# Patient Record
Sex: FEMALE | Race: WHITE | NOT HISPANIC OR LATINO | URBAN - METROPOLITAN AREA
[De-identification: names, ages, dates, MRNs, and addresses within clinical notes are randomized per-mention and may not be internally consistent; named-entity substitution may affect disease eponyms.]

---

## 2023-07-05 PROBLEM — E78.5 HYPERLIPIDEMIA: Status: ACTIVE | Noted: 2023-07-05

## 2023-08-10 DIAGNOSIS — I10 BENIGN ESSENTIAL HYPERTENSION: ICD-10-CM

## 2023-08-22 PROBLEM — I10 BENIGN ESSENTIAL HYPERTENSION: Status: ACTIVE | Noted: 2023-08-22

## 2023-08-22 RX ORDER — BISOPROLOL FUMARATE AND HYDROCHLOROTHIAZIDE 5; 6.25 MG/1; MG/1
1 TABLET ORAL DAILY
Qty: 90 TABLET | Refills: 3 | Status: SHIPPED | OUTPATIENT
Start: 2023-08-22

## 2023-08-22 RX ORDER — BISOPROLOL FUMARATE AND HYDROCHLOROTHIAZIDE 5; 6.25 MG/1; MG/1
1 TABLET ORAL DAILY
COMMUNITY
Start: 2013-08-23 | End: 2023-08-22 | Stop reason: SDUPTHER

## 2023-08-22 RX ORDER — LOSARTAN POTASSIUM 50 MG/1
50 TABLET ORAL DAILY
Qty: 100 TABLET | Refills: 2 | Status: SHIPPED | OUTPATIENT
Start: 2023-08-22

## 2025-01-28 ENCOUNTER — HOME VISIT (OUTPATIENT)
Dept: POST ACUTE CARE | Facility: EXTERNAL LOCATION | Age: 80
End: 2025-01-28

## 2025-01-28 DIAGNOSIS — F51.3 SLEEP WALKING DISORDER: ICD-10-CM

## 2025-01-28 DIAGNOSIS — F41.9 ANXIETY AND DEPRESSION: ICD-10-CM

## 2025-01-28 DIAGNOSIS — E78.5 HYPERLIPIDEMIA, UNSPECIFIED HYPERLIPIDEMIA TYPE: ICD-10-CM

## 2025-01-28 DIAGNOSIS — F03.90 ADVANCING DEMENTIA (MULTI): Primary | ICD-10-CM

## 2025-01-28 DIAGNOSIS — F32.A ANXIETY AND DEPRESSION: ICD-10-CM

## 2025-01-28 DIAGNOSIS — M81.0 OSTEOPOROSIS, UNSPECIFIED OSTEOPOROSIS TYPE, UNSPECIFIED PATHOLOGICAL FRACTURE PRESENCE: ICD-10-CM

## 2025-01-28 DIAGNOSIS — I10 BENIGN ESSENTIAL HYPERTENSION: ICD-10-CM

## 2025-02-01 PROBLEM — F32.A ANXIETY AND DEPRESSION: Status: ACTIVE | Noted: 2025-02-01

## 2025-02-01 PROBLEM — F51.3 SLEEP WALKING DISORDER: Status: ACTIVE | Noted: 2025-02-01

## 2025-02-01 PROBLEM — M81.0 OSTEOPOROSIS: Status: ACTIVE | Noted: 2025-02-01

## 2025-02-01 PROBLEM — F41.9 ANXIETY AND DEPRESSION: Status: ACTIVE | Noted: 2025-02-01

## 2025-02-01 PROBLEM — F03.90 ADVANCING DEMENTIA (MULTI): Status: ACTIVE | Noted: 2025-02-01

## 2025-02-01 NOTE — PROGRESS NOTES
Kiley Coleman   79 y.o.  female  @location@            Assessment and Plan:  History and physical    1. Hypercholesteremia E78.00 Controlled very well.  Continue simvastatin.  Liberalize diet and allow full fat products to prevent weight loss.  2. Cognitive impairment R41.89 Symptoms persist. She is tolerating donepezil 5 mg well. Upon: Discussion we decided today to upped her donepezil dose to 10 mg daily. I also recommend to schedule appointment with Dr. Nix. I recommend careful driving and her daughter Ania supervise patient with driving skills.  Ordered:   BRENDAN [facility] [place] Referral, CARMEN PhD, AMIR, 07/05/2023, Order for future visit   3. Diverticulosis K57.90 Controlled well. Continue probiotic. Increase eating yogurt.    4. Right knee DJD M17.11 Not controlled. Symptoms worsened in the last 6 to 8 months. Recommend appointment with orthopedics.  Ordered:   AMB [facility] [place] Referral, MADHURI PAIGE DO, 07/05/2023, Order for future visit   5. Breast cancer screening by mammogram Z12.31 Due in December 2023.  Ordered:   [facility]AL, 07/05/2023, Routine, SCREENING, Ambulatory, 3D;, Breast cancer screening by mammogram   6. 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 She did not have any injury related to nighttime walking. She shows more of the signs of confusion and no recollection of the event.  7. Weight loss R63.4 Progressed. Lost another 5 pounds since last office visit about 4 months ago. Recommend to keep scheduled meals 4 times a day; patient may substitute supper with 1 serving of Ensure.  8. Vitamin D deficiency E55.9 Recommend to optimize intake of calcium up to 1200 Mg a day and vitamin D up to 2000 Mg a day.  9. Sebaceous cyst of right axilla L72.3 Examined today. No signs of infection. No lymphadenopathy in bilateral axilla. Monitor.    10. Hypertension.  Continue losartan 50 mg 2 times a day.    I discussed advanced care planning for more than 16 minutes including the explanation and  discussion of advanced directives. Information and advise was also provided on DO NOT RESUSCITATE and patient encouraged to consider this  Patient is not sure about DNR at this time.      -Fall prevention    -Cognitive engagement     -Monitor and treat blood pressure     -Aggressive decubitus ulcer prevention.     -Bowel and bladder care     -Optimal nutrition and supplementation as needed     -GI  and DVT prophylaxis     -Symptom control     -Ambulation as tolerated     -Will follow    Charting is done using voice recognition software and may contain errors which have not been completely corrected    Source of history: Nurse, Medical personnel, Medical record, Patient.  History limitation: None.    HPI:  History and physical    Patient is unable to give any detailed history and therefore history is obtained from the chart  No acute complaints voiced by the patient or acute concerns raised by nursing    History of hospitalization-    Problem List/Past Medical History Ongoing Alzheimer's dementia Anxiety Arthritis Breast cancer screening by mammogram Chronic GERD Cognitive impairment Diverticulosis Generalized anxiety disorder Hypercholesteremia Hyperlipidemia Hypertension Intolerance of oral bisphosphonate therapy Macular degeneration Memory changes Moderate dementia with anxiety Osteoporosis Right knee DJD Sebaceous cyst of right axilla Sleep walking disorder Vitamin D deficiency Weight loss Procedure/Surgical History Colonoscopy.: 04/15/19  right breast lumpectomy ( benign): 1992  hysterectomy: 1986  Excision of midfoot exostosis, Left foot  Ankle surgery    Allergies codeine unknown penicillins Social History   Alcohol - Denies Alcohol Use Use:Current Type:Liquor Frequency:3-5 times per week   Substance Abuse - Denies Substance Abuse   Tobacco - Low Risk Use:Former smoker, quit more than 30 days ago Family History Hypertension: Mother and Father.      Current Outpatient Medications   Medication Sig Dispense  Refill    bisoproloL-hydrochlorothiazide (Ziac) 5-6.25 mg tablet TAKE 1 TABLET BY MOUTH  DAILY 90 tablet 3    bisoproloL-hydrochlorothiazide (Ziac) 5-6.25 mg tablet Take 1 tablet by mouth once daily. 90 tablet 3    losartan (Cozaar) 50 mg tablet TAKE 1 TABLET BY MOUTH DAILY 100 tablet 2    simvastatin (Zocor) 20 mg tablet TAKE 1 TABLET BY MOUTH  DAILY 100 tablet 2     No current facility-administered medications for this visit.       Physical Exam:  Vital signs as per nursing/MA documentation were reviewed  General appearance: Alert and in no acute distress  HEENT: Normal Inspection  Neck - Normal Inspection  Respiratory : No respiratory distress. Lungs are clear   Cardiovascular: heart rate normal. No gallop  Back - normal inspection  Skin inspection:Warm  Musculoskeletal : No deformities  Neuro : Limited exam. Baseline    ROS: Review of symptoms is negative except for what is mentioned in HPI    Results/Data  Records including but not limited to electronic medical records, relevant lab work and imaging from patient's health care facility encounter were reviewed and independently verified      Charting was completed using voice recognition technology and may include unintended errors.    Discussed with patient/family, RN, and NP.

## 2025-02-27 ENCOUNTER — HOME VISIT (OUTPATIENT)
Dept: POST ACUTE CARE | Facility: EXTERNAL LOCATION | Age: 80
End: 2025-02-27
Payer: MEDICARE

## 2025-02-27 DIAGNOSIS — F03.90 ADVANCING DEMENTIA (MULTI): ICD-10-CM

## 2025-02-27 DIAGNOSIS — F41.9 ANXIETY AND DEPRESSION: ICD-10-CM

## 2025-02-27 DIAGNOSIS — F32.A ANXIETY AND DEPRESSION: ICD-10-CM

## 2025-02-27 DIAGNOSIS — F51.3 SLEEP WALKING DISORDER: ICD-10-CM

## 2025-02-27 DIAGNOSIS — M81.0 OSTEOPOROSIS, UNSPECIFIED OSTEOPOROSIS TYPE, UNSPECIFIED PATHOLOGICAL FRACTURE PRESENCE: ICD-10-CM

## 2025-02-27 DIAGNOSIS — I10 BENIGN ESSENTIAL HYPERTENSION: Primary | ICD-10-CM

## 2025-02-27 PROCEDURE — 99348 HOME/RES VST EST LOW MDM 30: CPT | Performed by: EMERGENCY MEDICINE

## 2025-03-01 NOTE — PROGRESS NOTES
Kiley Coleman   79 y.o.  female  @location@            Assessment and Plan:      1. Hypercholesteremia E78.00 Controlled very well.  Continue simvastatin.  Liberalize diet and allow full fat products to prevent weight loss.  2. Cognitive impairment R41.89 Symptoms persist. She is tolerating donepezil 5 mg well. Upon: Discussion we decided today to upped her donepezil dose to 10 mg daily. I also recommend to schedule appointment with Dr. iNx. I recommend careful driving and her daughter Ania supervise patient with driving skills.  Ordered:   [facility] [place], CARMEN PhD, AMIR, 07/05/2023, Order for future visit   3. Diverticulosis K57.90 Controlled well. Continue probiotic. Increase eating yogurt.    4. Right knee DJD M17.11 Not controlled. Symptoms worsened in the last 6 to 8 months. Recommend appointment with orthopedics.  Ordered:   [facility] [place], MADHURI PAIGE DO, 07/05/2023, Order for future visit   5. Breast cancer screening by mammogram Z12.31 Due in December 2023.  Ordered:   [facility]M DIGITAL SCRN BILATERAL, 07/05/2023, Routine, SCREENING, Ambulatory, 3D;, Breast cancer screening by mammogram   6. 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 She did not have any injury related to nighttime walking. She shows more of the signs of confusion and no recollection of the event.  7. Weight loss R63.4 Progressed. Lost another 5 pounds since last office visit about 4 months ago. Recommend to keep scheduled meals 4 times a day; patient may substitute supper with 1 serving of Ensure.  8. Vitamin D deficiency E55.9 Recommend to optimize intake of calcium up to 1200 Mg a day and vitamin D up to 2000 Mg a day.  9. Sebaceous cyst of right axilla L72.3 Examined today. No signs of infection. No lymphadenopathy in bilateral axilla. Monitor.    10. Hypertension.  Continue losartan 50 mg 2 times a day.    Rx list reviewed.   PT and OT evaluation is in the process.   Routine safety measures, fall precautions, risk  modification and alarm placement if needed for prevention of falls.   Skin care precautions, prevention of pressures sores at pressure points assessed.   Pt needs to be monitored frequently by nursing staff particularly at night time.   Any confusion, agitation or behavioural disturbance needs to be attended, as per home policy   rapid covid Ag assay need to be done, notify if positive.   If needed appropriate measures to be taken for alarm placements and assisted devices, pt was told not to get up and ambulate at night unless help and assist available at bedside,   labs will be done as per our routine protocol.   PO intake need to be monitored if consuming po.       Charting is done using voice recognition software and may contain errors which have not been completely corrected      Source of history: Nurse, Medical personnel, Medical record, Patient.  History limitation: None.    HPI:      Patient is unable to give any detailed history and therefore history is obtained from the chart  No acute complaints voiced by the patient or acute concerns raised by nursing    History of hospitalization-    Problem List/Past Medical History Ongoing Alzheimer's dementia Anxiety Arthritis Breast cancer screening by mammogram Chronic GERD Cognitive impairment Diverticulosis Generalized anxiety disorder Hypercholesteremia Hyperlipidemia Hypertension Intolerance of oral bisphosphonate therapy Macular degeneration Memory changes Moderate dementia with anxiety Osteoporosis Right knee DJD Sebaceous cyst of right axilla Sleep walking disorder Vitamin D deficiency Weight loss Procedure/Surgical History Colonoscopy.: 04/15/19  right breast lumpectomy ( benign): 1992  hysterectomy: 1986  Excision of midfoot exostosis, Left foot  Ankle surgery    Allergies codeine unknown penicillins Social History   Alcohol - Denies Alcohol Use Use:Current Type:Liquor Frequency:3-5 times per week   Substance Abuse - Denies Substance Abuse   Tobacco - Low  Risk Use:Former smoker, quit more than 30 days ago Family History Hypertension: Mother and Father.      Current Outpatient Medications   Medication Sig Dispense Refill    bisoproloL-hydrochlorothiazide (Ziac) 5-6.25 mg tablet TAKE 1 TABLET BY MOUTH  DAILY 90 tablet 3    bisoproloL-hydrochlorothiazide (Ziac) 5-6.25 mg tablet Take 1 tablet by mouth once daily. 90 tablet 3    losartan (Cozaar) 50 mg tablet TAKE 1 TABLET BY MOUTH DAILY 100 tablet 2    simvastatin (Zocor) 20 mg tablet TAKE 1 TABLET BY MOUTH  DAILY 100 tablet 2     No current facility-administered medications for this visit.       Physical Exam:  Vital signs as per nursing/MA documentation were reviewed  General appearance: Alert and in no acute distress  HEENT: Normal Inspection  Neck - Normal Inspection  Respiratory : No respiratory distress. Lungs are clear   Cardiovascular: heart rate normal. No gallop  Back - normal inspection  Skin inspection:Warm  Musculoskeletal : No deformities  Neuro : Limited exam. Baseline    ROS: Review of symptoms is negative except for what is mentioned in HPI    Results/Data  Records including but not limited to electronic medical records, relevant lab work and imaging from patient's health care facility encounter were reviewed and independently verified      Charting was completed using voice recognition technology and may include unintended errors.    Discussed with patient/family, RN, and NP.

## 2025-03-20 ENCOUNTER — HOME VISIT (OUTPATIENT)
Dept: POST ACUTE CARE | Facility: EXTERNAL LOCATION | Age: 80
End: 2025-03-20
Payer: MEDICARE

## 2025-03-20 DIAGNOSIS — M81.0 OSTEOPOROSIS, UNSPECIFIED OSTEOPOROSIS TYPE, UNSPECIFIED PATHOLOGICAL FRACTURE PRESENCE: ICD-10-CM

## 2025-03-20 DIAGNOSIS — F32.A ANXIETY AND DEPRESSION: Primary | ICD-10-CM

## 2025-03-20 DIAGNOSIS — E78.5 HYPERLIPIDEMIA, UNSPECIFIED HYPERLIPIDEMIA TYPE: ICD-10-CM

## 2025-03-20 DIAGNOSIS — F41.9 ANXIETY AND DEPRESSION: Primary | ICD-10-CM

## 2025-03-20 DIAGNOSIS — I10 BENIGN ESSENTIAL HYPERTENSION: ICD-10-CM

## 2025-03-20 PROCEDURE — 99349 HOME/RES VST EST MOD MDM 40: CPT | Performed by: EMERGENCY MEDICINE

## 2025-03-20 NOTE — PROGRESS NOTES
Kiley Cloeman   79 y.o.  female  @location@            Assessment and Plan:      1. Hypercholesteremia E78.00 Controlled very well.  Continue simvastatin.  Liberalize diet and allow full fat products to prevent weight loss.  2. Cognitive impairment R41.89 Symptoms persist. She is tolerating donepezil 5 mg well. Upon: Discussion we decided today to upped her donepezil dose to 10 mg daily. I also recommend to schedule appointment with Dr. Nix. I recommend careful driving and her daughter Ania supervise patient with driving skills.  Ordered:   BRENDAN [facility] [place] Referral, CARMEN PhD, AMIR, 07/05/2023, Order for future visit   3. Diverticulosis K57.90 Controlled well. Continue probiotic. Increase eating yogurt.    4. Right knee DJD M17.11 Not controlled. Symptoms worsened in the last 6 to 8 months. Recommend appointment with orthopedics.  Ordered:   AMB [facility] [place] Referral, MADHURI PAIGE DO, 07/05/2023, Order for future visit   5. Breast cancer screening by mammogram Z12.31 Due in December 2023.  Ordered:   MAMM DIGITAL SCRN BILATERAL, 07/05/2023, Routine, SCREENING, Ambulatory, 3D;, Breast cancer screening by mammogram   6. 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 She did not have any injury related to nighttime walking. She shows more of the signs of confusion and no recollection of the event.  7. Weight loss R63.4 Progressed. Lost another 5 pounds since last office visit about 4 months ago. Recommend to keep scheduled meals 4 times a day; patient may substitute supper with 1 serving of Ensure.  8. Vitamin D deficiency E55.9 Recommend to optimize intake of calcium up to 1200 Mg a day and vitamin D up to 2000 Mg a day.  9. Sebaceous cyst of right axilla L72.3 Examined today. No signs of infection. No lymphadenopathy in bilateral axilla. Monitor.    10. Hypertension.  Continue losartan 50 mg 2 times a day.    1. medications are reviewed      2. Continue with rehabilitative, supportive, and or restorative care as  ordered and as the patient tolerates     3. Laboratory evaluations will be monitored on an ongoing as needed basis     4. Medications have been cross-referenced with the patient's diagnoses list, and medications reductions have been considered and/or implemented.     5. Pharmacy recommendations are addressed on an ongoing as needed basis.     6. Controlled substances have been electronically scripted every 60 days for opiates and others of similar schedule, and every 6 months for sedative/hypnotics and others of similar schedule.     7. Nursing has been queried about any potential adverse events that need to be reported to me.    Salient information and adjustment of care plan pertaining to this individual patient interaction today are the following:      A. We will continue with restorative and supportive care as the patient tolerates    B. Laboratory examinations will continue to be drawn on an ongoing as-needed basis. The patient's weight needs to be monitored and if needed we may need to institute appetite stimulating medication    C. The patient's long term prognosis is guarded    Charting is done using voice recognition software and may contain errors which may not have been completely corrected        Source of history: Nurse, Medical personnel, Medical record, Patient.  History limitation: None.    HPI:      Patient is unable to give any detailed history and therefore history is obtained from the chart  No acute complaints voiced by the patient or acute concerns raised by nursing    History of hospitalization-    Problem List/Past Medical History Ongoing Alzheimer's dementia Anxiety Arthritis Breast cancer screening by mammogram Chronic GERD Cognitive impairment Diverticulosis Generalized anxiety disorder Hypercholesteremia Hyperlipidemia Hypertension Intolerance of oral bisphosphonate therapy Macular degeneration Memory changes Moderate dementia with anxiety Osteoporosis Right knee DJD Sebaceous cyst of  right axilla Sleep walking disorder Vitamin D deficiency Weight loss Procedure/Surgical History Colonoscopy.: 04/15/19  right breast lumpectomy ( benign): 1992  hysterectomy: 1986  Excision of midfoot exostosis, Left foot  Ankle surgery    Allergies codeine unknown penicillins Social History   Alcohol - Denies Alcohol Use Use:Current Type:Liquor Frequency:3-5 times per week   Substance Abuse - Denies Substance Abuse   Tobacco - Low Risk Use:Former smoker, quit more than 30 days ago Family History Hypertension: Mother and Father.      Current Outpatient Medications   Medication Sig Dispense Refill    bisoproloL-hydrochlorothiazide (Ziac) 5-6.25 mg tablet TAKE 1 TABLET BY MOUTH  DAILY 90 tablet 3    bisoproloL-hydrochlorothiazide (Ziac) 5-6.25 mg tablet Take 1 tablet by mouth once daily. 90 tablet 3    losartan (Cozaar) 50 mg tablet TAKE 1 TABLET BY MOUTH DAILY 100 tablet 2    simvastatin (Zocor) 20 mg tablet TAKE 1 TABLET BY MOUTH  DAILY 100 tablet 2     No current facility-administered medications for this visit.       Physical Exam:  Vital signs as per nursing/MA documentation were reviewed  General appearance: Alert and in no acute distress  HEENT: Normal Inspection  Neck - Normal Inspection  Respiratory : No respiratory distress. Lungs are clear   Cardiovascular: heart rate normal. No gallop  Back - normal inspection  Skin inspection:Warm  Musculoskeletal : No deformities  Neuro : Limited exam. Baseline    ROS: Review of symptoms is negative except for what is mentioned in HPI    Results/Data  Records including but not limited to electronic medical records, relevant lab work and imaging from patient's health care facility encounter were reviewed and independently verified      Charting was completed using voice recognition technology and may include unintended errors.    Discussed with patient/family, RN, and NP.

## 2025-04-22 ENCOUNTER — HOME VISIT (OUTPATIENT)
Dept: POST ACUTE CARE | Facility: EXTERNAL LOCATION | Age: 80
End: 2025-04-22
Payer: MEDICARE

## 2025-04-22 DIAGNOSIS — M81.0 OSTEOPOROSIS, UNSPECIFIED OSTEOPOROSIS TYPE, UNSPECIFIED PATHOLOGICAL FRACTURE PRESENCE: Primary | ICD-10-CM

## 2025-04-22 DIAGNOSIS — F51.3 SLEEP WALKING DISORDER: ICD-10-CM

## 2025-04-22 DIAGNOSIS — I10 BENIGN ESSENTIAL HYPERTENSION: ICD-10-CM

## 2025-04-22 DIAGNOSIS — F03.90 ADVANCING DEMENTIA (MULTI): ICD-10-CM

## 2025-04-26 NOTE — PROGRESS NOTES
Kiley Coleman   79 y.o.  female  @location@            Assessment and Plan:      1. Hypercholesteremia E78.00 Controlled very well.  Continue simvastatin.  Liberalize diet and allow full fat products to prevent weight loss.  2. Cognitive impairment R41.89 Symptoms persist. She is tolerating donepezil 5 mg well. Upon: Discussion we decided today to upped her donepezil dose to 10 mg daily. I also recommend to schedule appointment with Dr. Nix. I recommend careful driving and her daughter Ania supervise patient with driving skills.  Ordered:   BR[facility] [place], CARMEN PhD, AMIR, 07/05/2023, Order for future visit   3. Diverticulosis K57.90 Controlled well. Continue probiotic. Increase eating yogurt.    4. Right knee DJD M17.11 Not controlled. Symptoms worsened in the last 6 to 8 months. Recommend appointment with orthopedics.  Ordered:   [facility] [place], MADHURI PAIGE DO, 07/05/2023, Order for future visit   5. Breast cancer screening by mammogram Z12.31 Due in December 2023.  Ordered:   MAMM DIGITAL SCRN BILATERAL, 07/05/2023, Routine, SCREENING, Ambulatory, 3D;, Breast cancer screening by mammogram   6. 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 She did not have any injury related to nighttime walking. She shows more of the signs of confusion and no recollection of the event.  7. Weight loss R63.4 Progressed. Lost another 5 pounds since last office visit about 4 months ago. Recommend to keep scheduled meals 4 times a day; patient may substitute supper with 1 serving of Ensure.  8. Vitamin D deficiency E55.9 Recommend to optimize intake of calcium up to 1200 Mg a day and vitamin D up to 2000 Mg a day.  9. Sebaceous cyst of right axilla L72.3 Examined today. No signs of infection. No lymphadenopathy in bilateral axilla. Monitor.    10. Hypertension.  Continue losartan 50 mg 2 times a day.    This patient was seen for my regular monthly visit, nursing evaluations and nursing notes were reviewed,  interim events are reviewed, interim concerns and messages were reviewed as we have communicated with nursing staff.  Any issues with the falls, skin care impairment, declining physical condition are reviewed and noted, diagnosis list were reviewed, list of medications were reviewed, living will related issues were reviewed, overall patient has been doing well, any declining in patient's condition or any change in patient's condition needs to be notified to physician promptly, discussed with nursing staff, if needed would communicate with family.  Patient stays confined here at the facility for long-term care, there are always concerns about long-term care related issues and concerns.  Nursing staff is trying their best to keep patient safe, all sort of measures has been taken to keep patient safe and comfortable.       Source of history: Nurse, Medical personnel, Medical record, Patient.  History limitation: None.    HPI:      Patient is unable to give any detailed history and therefore history is obtained from the chart  No acute complaints voiced by the patient or acute concerns raised by nursing    History of hospitalization-    Problem List/Past Medical History Ongoing Alzheimer's dementia Anxiety Arthritis Breast cancer screening by mammogram Chronic GERD Cognitive impairment Diverticulosis Generalized anxiety disorder Hypercholesteremia Hyperlipidemia Hypertension Intolerance of oral bisphosphonate therapy Macular degeneration Memory changes Moderate dementia with anxiety Osteoporosis Right knee DJD Sebaceous cyst of right axilla Sleep walking disorder Vitamin D deficiency Weight loss Procedure/Surgical History Colonoscopy.: 04/15/19  right breast lumpectomy ( benign): 1992  hysterectomy: 1986  Excision of midfoot exostosis, Left foot  Ankle surgery    Allergies codeine unknown penicillins Social History   Alcohol - Denies Alcohol Use Use:Current Type:Liquor Frequency:3-5 times per week   Substance Abuse -  Denies Substance Abuse   Tobacco - Low Risk Use:Former smoker, quit more than 30 days ago Family History Hypertension: Mother and Father.      Current Outpatient Medications   Medication Sig Dispense Refill    bisoproloL-hydrochlorothiazide (Ziac) 5-6.25 mg tablet TAKE 1 TABLET BY MOUTH  DAILY 90 tablet 3    bisoproloL-hydrochlorothiazide (Ziac) 5-6.25 mg tablet Take 1 tablet by mouth once daily. 90 tablet 3    losartan (Cozaar) 50 mg tablet TAKE 1 TABLET BY MOUTH DAILY 100 tablet 2    simvastatin (Zocor) 20 mg tablet TAKE 1 TABLET BY MOUTH  DAILY 100 tablet 2     No current facility-administered medications for this visit.       Physical Exam:  Vital signs as per nursing/MA documentation were reviewed  General appearance: Alert and in no acute distress  HEENT: Normal Inspection  Neck - Normal Inspection  Respiratory : No respiratory distress. Lungs are clear   Cardiovascular: heart rate normal. No gallop  Back - normal inspection  Skin inspection:Warm  Musculoskeletal : No deformities  Neuro : Limited exam. Baseline    ROS: Review of symptoms is negative except for what is mentioned in HPI    Results/Data  Records including but not limited to electronic medical records, relevant lab work and imaging from patient's health care facility encounter were reviewed and independently verified      Charting was completed using voice recognition technology and may include unintended errors.    Discussed with patient/family, RN, and NP.

## 2025-05-27 ENCOUNTER — HOME VISIT (OUTPATIENT)
Dept: POST ACUTE CARE | Facility: EXTERNAL LOCATION | Age: 80
End: 2025-05-27
Payer: MEDICARE

## 2025-05-27 DIAGNOSIS — F32.A ANXIETY AND DEPRESSION: ICD-10-CM

## 2025-05-27 DIAGNOSIS — M81.0 OSTEOPOROSIS, UNSPECIFIED OSTEOPOROSIS TYPE, UNSPECIFIED PATHOLOGICAL FRACTURE PRESENCE: ICD-10-CM

## 2025-05-27 DIAGNOSIS — F51.3 SLEEP WALKING DISORDER: ICD-10-CM

## 2025-05-27 DIAGNOSIS — F03.90 ADVANCING DEMENTIA (MULTI): ICD-10-CM

## 2025-05-27 DIAGNOSIS — I10 BENIGN ESSENTIAL HYPERTENSION: Primary | ICD-10-CM

## 2025-05-27 DIAGNOSIS — F41.9 ANXIETY AND DEPRESSION: ICD-10-CM

## 2025-05-27 PROCEDURE — 99349 HOME/RES VST EST MOD MDM 40: CPT | Performed by: EMERGENCY MEDICINE

## 2025-05-29 NOTE — PROGRESS NOTES
Kiley Coleman   79 y.o.  female  @location@            Assessment and Plan:      1. Hypercholesteremia E78.00 Controlled very well.  Continue simvastatin.  Liberalize diet and allow full fat products to prevent weight loss.  2. Cognitive impairment R41.89 Symptoms persist. She is tolerating donepezil 5 mg well. Upon: Discussion we decided today to upped her donepezil dose to 10 mg daily. I also recommend to schedule appointment with Dr. Nix. I recommend careful driving and her daughter Ania supervise patient with driving skills.  [place]   [place] [facility] [place] CARMEN PhD, AMIR, 07/05/2023, Order for future visit   3. Diverticulosis K57.90 Controlled well. Continue probiotic. Increase eating yogurt.    4. Right knee DJD M17.11 Not controlled. Symptoms worsened in the last 6 to 8 months. Recommend appointment with orthopedics.  [place]dered:   [place] [facility] [place] MADHURI PAIGE DO, 07/05/2023, Order for future visit   5. Breast cancer screening by mammogram Z12.31 Due in December 2023.  Ordered:   [facility]ITAL [facility]CRN BILATERAL, 07/05/2023, Routine, SCREENING, Ambulatory, 3D;, Breast cancer screening by mammogram   6. 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 She did not have any injury related to nighttime walking. She shows more of the signs of confusion and no recollection of the event.  7. Weight loss R63.4 Progressed. Lost another 5 pounds since last office visit about 4 months ago. Recommend to keep scheduled meals 4 times a day; patient may substitute supper with 1 serving of Ensure.  8. Vitamin D deficiency E55.9 Recommend to optimize intake of calcium up to 1200 Mg a day and vitamin D up to 2000 Mg a day.  9. Sebaceous cyst of right axilla L72.3 Examined today. No signs of infection. No lymphadenopathy in bilateral axilla. Monitor.    10. Hypertension.  Continue losartan 50 mg 2 times a day.    This patient was seen for my regular monthly visit, nursing evaluations and nursing notes were reviewed,  interim events are reviewed, interim concerns and messages were reviewed as we have communicated with nursing staff.  Any issues with the falls, skin care impairment, declining physical condition are reviewed and noted, diagnosis list were reviewed, list of medications were reviewed, living will related issues were reviewed, overall patient has been doing well, any declining in patient's condition or any change in patient's condition needs to be notified to physician promptly, discussed with nursing staff, if needed would communicate with family.  Patient stays confined here at the facility for long-term care, there are always concerns about long-term care related issues and concerns.  Nursing staff is trying their best to keep patient safe, all sort of measures has been taken to keep patient safe and comfortable.   Skin integrity:  Nursing to monitor skin integrity as patient is at risk for pressure injuries.  Wound care per nursing  See Facility notes for measurements/assessments  Turn and reposition Q 2 hours or more  Air mattress and when up in chair cushion reducing device  Dietician to evaluate and recommend:  Nutritional supplement:  Please monitor skin integrity and other pressure areas  Referral to wound clinic if appropriate:     Pt has been seen for follow up visit.  Recent nursing evaluation and notes were reviewed.   Overall, patient is stable despite his/her chronic conditions.      Any decline or change in condition needs to be communicated with the physician or myself.    Discussion with nursing staff regarding ongoing care and management.  If needed, would communicate with family who are not present at this time.   There are no concerns at this time.  We will continue with the medications noted above.    We will continue to follow the patient here at the facility.      *Please note that nursing facility, outside laboratory agency, and  AE do not interface.       Source of history: Nurse, Medical  personnel, Medical record, Patient.  History limitation: None.    HPI:      Patient is unable to give any detailed history and therefore history is obtained from the chart  No acute complaints voiced by the patient or acute concerns raised by nursing    History of hospitalization-    Problem List/Past Medical History Ongoing Alzheimer's dementia Anxiety Arthritis Breast cancer screening by mammogram Chronic GERD Cognitive impairment Diverticulosis Generalized anxiety disorder Hypercholesteremia Hyperlipidemia Hypertension Intolerance of oral bisphosphonate therapy Macular degeneration Memory changes Moderate dementia with anxiety Osteoporosis Right knee DJD Sebaceous cyst of right axilla Sleep walking disorder Vitamin D deficiency Weight loss Procedure/Surgical History Colonoscopy.: 04/15/19  right breast lumpectomy ( benign): 1992  hysterectomy: 1986  Excision of midfoot exostosis, Left foot  Ankle surgery    Allergies codeine unknown penicillins Social History   Alcohol - Denies Alcohol Use Use:Current Type:Liquor Frequency:3-5 times per week   Substance Abuse - Denies Substance Abuse   Tobacco - Low Risk Use:Former smoker, quit more than 30 days ago Family History Hypertension: Mother and Father.      Current Outpatient Medications   Medication Sig Dispense Refill    bisoproloL-hydrochlorothiazide (Ziac) 5-6.25 mg tablet TAKE 1 TABLET BY MOUTH  DAILY 90 tablet 3    bisoproloL-hydrochlorothiazide (Ziac) 5-6.25 mg tablet Take 1 tablet by mouth once daily. 90 tablet 3    losartan (Cozaar) 50 mg tablet TAKE 1 TABLET BY MOUTH DAILY 100 tablet 2    simvastatin (Zocor) 20 mg tablet TAKE 1 TABLET BY MOUTH  DAILY 100 tablet 2     No current facility-administered medications for this visit.       Physical Exam:  Vital signs as per nursing/MA documentation were reviewed  General appearance: Alert and in no acute distress  HEENT: Normal Inspection  Neck - Normal Inspection  Respiratory : No respiratory distress. Lungs are  clear   Cardiovascular: heart rate normal. No gallop  Back - normal inspection  Skin inspection:Warm  Musculoskeletal : No deformities  Neuro : Limited exam. Baseline    ROS: Review of symptoms is negative except for what is mentioned in HPI    Results/Data  Records including but not limited to electronic medical records, relevant lab work and imaging from patient's health care facility encounter were reviewed and independently verified      Charting was completed using voice recognition technology and may include unintended errors.    Discussed with patient/family, RN, and NP.

## 2025-06-17 ENCOUNTER — HOME VISIT (OUTPATIENT)
Dept: POST ACUTE CARE | Facility: EXTERNAL LOCATION | Age: 80
End: 2025-06-17
Payer: MEDICARE

## 2025-06-17 DIAGNOSIS — F32.A ANXIETY AND DEPRESSION: ICD-10-CM

## 2025-06-17 DIAGNOSIS — I10 BENIGN ESSENTIAL HYPERTENSION: Primary | ICD-10-CM

## 2025-06-17 DIAGNOSIS — M81.0 OSTEOPOROSIS, UNSPECIFIED OSTEOPOROSIS TYPE, UNSPECIFIED PATHOLOGICAL FRACTURE PRESENCE: ICD-10-CM

## 2025-06-17 DIAGNOSIS — F03.90 ADVANCING DEMENTIA (MULTI): ICD-10-CM

## 2025-06-17 DIAGNOSIS — F41.9 ANXIETY AND DEPRESSION: ICD-10-CM

## 2025-06-17 PROCEDURE — 99348 HOME/RES VST EST LOW MDM 30: CPT | Performed by: EMERGENCY MEDICINE

## 2025-06-29 NOTE — PROGRESS NOTES
Kiley Coleman   79 y.o.  female  @location@            Assessment and Plan:      1. Hypercholesteremia E78.00 Controlled very well.  Continue simvastatin.  Liberalize diet and allow full fat products to prevent weight loss.  2. Cognitive impairment R41.89 Symptoms persist. She is tolerating donepezil 5 mg well. Upon: Discussion we decided today to upped her donepezil dose to 10 mg daily. I also recommend to schedule appointment with Dr. Nix. I recommend careful driving and her daughter Ania supervise patient with driving skills.  Ordered:   [facility] [place]erral, CARMEN PhD, AMIR, 07/05/2023, Order for future visit   3. Diverticulosis K57.90 Controlled well. Continue probiotic. Increase eating yogurt.    4. Right knee DJD M17.11 Not controlled. Symptoms worsened in the last 6 to 8 months. Recommend appointment with orthopedics.  Ordered:   [facility] [place]erral, MADHURI PAIGE DO, 07/05/2023, Order for future visit   5. Breast cancer screening by mammogram Z12.31 Due in December 2023.  Ordered:   [facility] DIGITAL SCRN BILATERAL, 07/05/2023, Routine, SCREENING, Ambulatory, 3D;, Breast cancer screening by mammogram   6. 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 She did not have any injury related to nighttime walking. She shows more of the signs of confusion and no recollection of the event.  7. Weight loss R63.4 Progressed. Lost another 5 pounds since last office visit about 4 months ago. Recommend to keep scheduled meals 4 times a day; patient may substitute supper with 1 serving of Ensure.  8. Vitamin D deficiency E55.9 Recommend to optimize intake of calcium up to 1200 Mg a day and vitamin D up to 2000 Mg a day.  9. Sebaceous cyst of right axilla L72.3 Examined today. No signs of infection. No lymphadenopathy in bilateral axilla. Monitor.    10. Hypertension.  Continue losartan 50 mg 2 times a day.    All available hospital and outpatient records have been reviewed. Will continue other current drug therapies  as ordered on the continuation of care documents. Physical and Occupational Therapy will assess and treat per POC. Analgesia for identified pain symptoms. Continue the various medicines for bowel and bladder symptoms as well as the vitamins and supplements per hospital instructions. Will assess and provide local care to abnormalities of skin integrity. Drug to drug interaction data as noted by the pharmacy has been reviewed. The patient's condition warrants the continuation of these drugs as prescribed by the medical specialists. Discharge planning will be coordinated through the  department. I have reviewed any advanced directive documentation that is contained in the admission packet as directives indicating whether a surrogate decision maker has been identified.       Source of history: Nurse, Medical personnel, Medical record, Patient.  History limitation: None.    HPI:      Patient is unable to give any detailed history and therefore history is obtained from the chart  No acute complaints voiced by the patient or acute concerns raised by nursing    History of hospitalization-    Problem List/Past Medical History Ongoing Alzheimer's dementia Anxiety Arthritis Breast cancer screening by mammogram Chronic GERD Cognitive impairment Diverticulosis Generalized anxiety disorder Hypercholesteremia Hyperlipidemia Hypertension Intolerance of oral bisphosphonate therapy Macular degeneration Memory changes Moderate dementia with anxiety Osteoporosis Right knee DJD Sebaceous cyst of right axilla Sleep walking disorder Vitamin D deficiency Weight loss Procedure/Surgical History Colonoscopy.: 04/15/19  right breast lumpectomy ( benign): 1992  hysterectomy: 1986  Excision of midfoot exostosis, Left foot  Ankle surgery    Allergies codeine unknown penicillins Social History   Alcohol - Denies Alcohol Use Use:Current Type:Liquor Frequency:3-5 times per week   Substance Abuse - Denies Substance Abuse   Tobacco -  Low Risk Use:Former smoker, quit more than 30 days ago Family History Hypertension: Mother and Father.      Current Outpatient Medications   Medication Sig Dispense Refill    bisoproloL-hydrochlorothiazide (Ziac) 5-6.25 mg tablet TAKE 1 TABLET BY MOUTH  DAILY 90 tablet 3    bisoproloL-hydrochlorothiazide (Ziac) 5-6.25 mg tablet Take 1 tablet by mouth once daily. 90 tablet 3    losartan (Cozaar) 50 mg tablet TAKE 1 TABLET BY MOUTH DAILY 100 tablet 2    simvastatin (Zocor) 20 mg tablet TAKE 1 TABLET BY MOUTH  DAILY 100 tablet 2     No current facility-administered medications for this visit.       Physical Exam:  Vital signs as per nursing/MA documentation were reviewed  General appearance: Alert and in no acute distress  HEENT: Normal Inspection  Neck - Normal Inspection  Respiratory : No respiratory distress. Lungs are clear   Cardiovascular: heart rate normal. No gallop  Back - normal inspection  Skin inspection:Warm  Musculoskeletal : No deformities  Neuro : Limited exam. Baseline    ROS: Review of symptoms is negative except for what is mentioned in HPI    Results/Data  Records including but not limited to electronic medical records, relevant lab work and imaging from patient's health care facility encounter were reviewed and independently verified      Charting was completed using voice recognition technology and may include unintended errors.    Discussed with patient/family, RN, and NP.

## 2025-07-29 ENCOUNTER — HOME VISIT (OUTPATIENT)
Dept: POST ACUTE CARE | Facility: EXTERNAL LOCATION | Age: 80
End: 2025-07-29
Payer: MEDICARE

## 2025-07-29 DIAGNOSIS — F03.90 ADVANCING DEMENTIA (MULTI): ICD-10-CM

## 2025-07-29 DIAGNOSIS — M81.0 OSTEOPOROSIS, UNSPECIFIED OSTEOPOROSIS TYPE, UNSPECIFIED PATHOLOGICAL FRACTURE PRESENCE: ICD-10-CM

## 2025-07-29 DIAGNOSIS — F41.9 ANXIETY AND DEPRESSION: ICD-10-CM

## 2025-07-29 DIAGNOSIS — I10 BENIGN ESSENTIAL HYPERTENSION: Primary | ICD-10-CM

## 2025-07-29 DIAGNOSIS — F32.A ANXIETY AND DEPRESSION: ICD-10-CM

## 2025-07-29 PROCEDURE — 99349 HOME/RES VST EST MOD MDM 40: CPT | Performed by: EMERGENCY MEDICINE

## 2025-07-31 NOTE — PROGRESS NOTES
Kiley Coleman   79 y.o.  female  @location@            Assessment and Plan:      1. Hypercholesteremia E78.00 Controlled very well.  Continue simvastatin.  Liberalize diet and allow full fat products to prevent weight loss.  2. Cognitive impairment R41.89 Symptoms persist. She is tolerating donepezil 5 mg well. Upon: Discussion we decided today to upped her donepezil dose to 10 mg daily. I also recommend to schedule appointment with Dr. Nix. I recommend careful driving and her daughter Ania supervise patient with driving skills.  Ordered:   BRENDAN [facility] [place], CARMEN PhD, AMIR, 07/05/2023, Order for future visit   3. Diverticulosis K57.90 Controlled well. Continue probiotic. Increase eating yogurt.    4. Right knee DJD M17.11 Not controlled. Symptoms worsened in the last 6 to 8 months. Recommend appointment with orthopedics.  Ordered:   AMB [facility] [place], MADHURI PAIGE DO, 07/05/2023, Order for future visit   5. Breast cancer screening by mammogram Z12.31 Due in December 2023.  Ordered:   [facility] DIGITAL SCRN BILATERAL, 07/05/2023, Routine, SCREENING, Ambulatory, 3D;, Breast cancer screening by mammogram   6. 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 She did not have any injury related to nighttime walking. She shows more of the signs of confusion and no recollection of the event.  7. Weight loss R63.4 Progressed. Lost another 5 pounds since last office visit about 4 months ago. Recommend to keep scheduled meals 4 times a day; patient may substitute supper with 1 serving of Ensure.  8. Vitamin D deficiency E55.9 Recommend to optimize intake of calcium up to 1200 Mg a day and vitamin D up to 2000 Mg a day.  9. Sebaceous cyst of right axilla L72.3 Examined today. No signs of infection. No lymphadenopathy in bilateral axilla. Monitor.    10. Hypertension.  Continue losartan 50 mg 2 times a day.    -Fall prevention    -Cognitive engagement     -Monitor and treat blood pressure     -Aggressive decubitus  ulcer prevention.     -Bowel and bladder care     -Optimal nutrition and supplementation as needed     -GI  and DVT prophylaxis     -Symptom control     -Ambulation as tolerated     -Will follow    Charting is done using voice recognition software and may contain errors which have not been completely corrected      Source of history: Nurse, Medical personnel, Medical record, Patient.  History limitation: None.    HPI:      Patient is unable to give any detailed history and therefore history is obtained from the chart  No acute complaints voiced by the patient or acute concerns raised by nursing    History of hospitalization-    Problem List/Past Medical History Ongoing Alzheimer's dementia Anxiety Arthritis Breast cancer screening by mammogram Chronic GERD Cognitive impairment Diverticulosis Generalized anxiety disorder Hypercholesteremia Hyperlipidemia Hypertension Intolerance of oral bisphosphonate therapy Macular degeneration Memory changes Moderate dementia with anxiety Osteoporosis Right knee DJD Sebaceous cyst of right axilla Sleep walking disorder Vitamin D deficiency Weight loss Procedure/Surgical History Colonoscopy.: 04/15/19  right breast lumpectomy ( benign): 1992  hysterectomy: 1986  Excision of midfoot exostosis, Left foot  Ankle surgery    Allergies codeine unknown penicillins Social History   Alcohol - Denies Alcohol Use Use:Current Type:Liquor Frequency:3-5 times per week   Substance Abuse - Denies Substance Abuse   Tobacco - Low Risk Use:Former smoker, quit more than 30 days ago Family History Hypertension: Mother and Father.      Current Outpatient Medications   Medication Sig Dispense Refill    bisoproloL-hydrochlorothiazide (Ziac) 5-6.25 mg tablet TAKE 1 TABLET BY MOUTH  DAILY 90 tablet 3    bisoproloL-hydrochlorothiazide (Ziac) 5-6.25 mg tablet Take 1 tablet by mouth once daily. 90 tablet 3    losartan (Cozaar) 50 mg tablet TAKE 1 TABLET BY MOUTH DAILY 100 tablet 2    simvastatin (Zocor) 20 mg  tablet TAKE 1 TABLET BY MOUTH  DAILY 100 tablet 2     No current facility-administered medications for this visit.       Physical Exam:  Vital signs as per nursing/MA documentation were reviewed  General appearance: Alert and in no acute distress  HEENT: Normal Inspection  Neck - Normal Inspection  Respiratory : No respiratory distress. Lungs are clear   Cardiovascular: heart rate normal. No gallop  Back - normal inspection  Skin inspection:Warm  Musculoskeletal : No deformities  Neuro : Limited exam. Baseline    ROS: Review of symptoms is negative except for what is mentioned in HPI    Results/Data  Records including but not limited to electronic medical records, relevant lab work and imaging from patient's health care facility encounter were reviewed and independently verified      Charting was completed using voice recognition technology and may include unintended errors.    Discussed with patient/family, RN, and NP.

## 2025-08-28 ENCOUNTER — HOME VISIT (OUTPATIENT)
Dept: POST ACUTE CARE | Facility: EXTERNAL LOCATION | Age: 80
End: 2025-08-28
Payer: MEDICARE

## 2025-08-28 DIAGNOSIS — Z00.00 WELLNESS EXAMINATION: Primary | ICD-10-CM

## 2025-08-28 DIAGNOSIS — F41.9 ANXIETY AND DEPRESSION: ICD-10-CM

## 2025-08-28 DIAGNOSIS — I10 BENIGN ESSENTIAL HYPERTENSION: ICD-10-CM

## 2025-08-28 DIAGNOSIS — F03.90 ADVANCING DEMENTIA (MULTI): ICD-10-CM

## 2025-08-28 DIAGNOSIS — E78.5 HYPERLIPIDEMIA, UNSPECIFIED HYPERLIPIDEMIA TYPE: ICD-10-CM

## 2025-08-28 DIAGNOSIS — F32.A ANXIETY AND DEPRESSION: ICD-10-CM

## 2025-08-28 PROCEDURE — 99497 ADVNCD CARE PLAN 30 MIN: CPT | Performed by: EMERGENCY MEDICINE

## 2025-08-28 PROCEDURE — G0439 PPPS, SUBSEQ VISIT: HCPCS | Performed by: EMERGENCY MEDICINE

## 2025-08-28 PROCEDURE — 99348 HOME/RES VST EST LOW MDM 30: CPT | Performed by: EMERGENCY MEDICINE

## 2025-08-28 PROCEDURE — 99397 PER PM REEVAL EST PAT 65+ YR: CPT | Performed by: EMERGENCY MEDICINE
